# Patient Record
Sex: FEMALE | Race: WHITE
[De-identification: names, ages, dates, MRNs, and addresses within clinical notes are randomized per-mention and may not be internally consistent; named-entity substitution may affect disease eponyms.]

---

## 2021-04-15 ENCOUNTER — HOSPITAL ENCOUNTER (EMERGENCY)
Dept: HOSPITAL 11 - JP.ED | Age: 21
Discharge: HOME | End: 2021-04-15
Payer: COMMERCIAL

## 2021-04-15 DIAGNOSIS — O03.9: Primary | ICD-10-CM

## 2021-04-15 NOTE — EDM.PDOC
ED HPI GENERAL MEDICAL PROBLEM





- General


Chief Complaint: OB/GYN Problem


Stated Complaint: 6 WKS PREGNAT, CRAMPS AND LIGHT HEAVY BLEEDING


Time Seen by Provider: 04/15/21 20:25


Source of Information: Reports: Patient


History Limitations: Reports: No Limitations





- History of Present Illness


INITIAL COMMENTS - FREE TEXT/NARRATIVE: 





20-year-old female, never pregnant before did a pregnancy test 6 days ago which 

was positive.  2 days ago she started having some cramping and today started 

bleeding.  She is passing some clots.  She just wants to make sure that she is 

miscarrying and not something else is wrong.  No fever chills, no significant 

abdominal pain.


Onset: Gradual


Duration: Day(s): (Symptoms over the past 2 or 3 days)


Location: Reports: Other (Pelvic cramping and pain, vaginal bleeding)


Associated Symptoms: Reports: No Other Symptoms


  ** abd cramping


Pain Score (Numeric/FACES): 4





- Related Data


                                    Allergies











Allergy/AdvReac Type Severity Reaction Status Date / Time


 


No Known Allergies Allergy   Verified 04/15/21 20:14











Home Meds: 


                                    Home Meds





NK [No Known Home Meds]  04/15/21 [History]











Past Medical History


HEENT History: Reports: Impaired Vision, Other (See Below)


Other HEENT History: contacts


Psychiatric History: Reports: Anxiety, Depression, PTSD





- Infectious Disease History


Infectious Disease History: Reports: Chicken Pox





- Past Surgical History


HEENT Surgical History: Reports: Adenoidectomy, Tonsillectomy


Musculoskeletal Surgical History: Reports: Arthroscopic Procedure, Other (See 

Below)


Other Musculoskeletal Surgeries/Procedures:: left and right knee arthroscopic 

procedure





Social & Family History





- Tobacco Use


Tobacco Use Status *Q: Never Tobacco User





- Recreational Drug Use


Recreational Drug Use: No





ED ROS GENERAL





- Review of Systems


Review Of Systems: See Below


Constitutional: Denies: Fever, Chills


Respiratory: Reports: No Symptoms


Cardiovascular: Reports: No Symptoms


GI/Abdominal: Reports: No Symptoms


: Reports: Other (Some pelvic cramping, vaginal bleeding).  Denies: Dysuria, 

Frequency, Urgency


Skin: Reports: No Symptoms


Neurological: Reports: No Symptoms





ED EXAM PREGNANCY





- Physical Exam


Exam: See Below


Exam Limited By: No Limitations


General Appearance: Alert, No Apparent Distress


Respiratory/Chest: No Respiratory Distress


Neurological: Alert, Oriented


Psychiatric: Normal Affect, Normal Mood


Skin Exam: Warm, Dry





Course





- Vital Signs


Last Recorded V/S: 


                                Last Vital Signs











Temp  97.9 F   04/15/21 20:19


 


Pulse  95   04/15/21 20:19


 


Resp  17   04/15/21 20:19


 


BP  170/104 H  04/15/21 20:19


 


Pulse Ox  99   04/15/21 20:19














- Orders/Labs/Meds


Labs: 


                                Laboratory Tests











  04/15/21 04/15/21 Range/Units





  20:52 20:52 


 


WBC  9.1   (4.5-11.0)  K/uL


 


RBC  4.70   (3.30-5.50)  M/uL


 


Hgb  13.9   (12.0-15.0)  g/dL


 


Hct  42.0   (36.0-48.0)  %


 


MCV  89   (80-98)  fL


 


MCH  30   (27-31)  pg


 


MCHC  33   (32-36)  %


 


Plt Count  279   (150-400)  K/uL


 


HCG, Quant   8 H  (0-6)  mIU/mL














- Re-Assessments/Exams


Free Text/Narrative Re-Assessment/Exam: 





04/16/21 04:13


CBC and quantitative beta-hCG was drawn, quantitative was only 8.  Explained to 

the patient that this is not indicative of a healthy pregnancy.  She should 

expect symptoms of a period,  possibly somewhat heavier than usual and can re

turn if pain or bleeding is uncontrolled or she develops fever.





Departure





- Departure


Time of Disposition: 21:38


Disposition: Home, Self-Care 01


Clinical Impression: 


 Complete miscarriage








- Discharge Information


Instructions:  Miscarriage, Easy-to-Read


Referrals: 


PCP,None [Primary Care Provider] - 


Forms:  ED Department Discharge


Care Plan Goals: 


Activity as tolerated, ibuprofen or naproxen for cramping and pain.  Return 

anytime if you feel you are bleeding too heavy, your pain is uncontrolled or you

develop other concerns.





Sepsis Event Note (ED)





- Evaluation


Sepsis Screening Result: No Definite Risk





- Focused Exam


Vital Signs: 


                                   Vital Signs











  Temp Pulse Resp BP Pulse Ox


 


 04/15/21 20:19  97.9 F  95  17  170/104 H  99


 


 04/15/21 20:17  97.9 F  95  17  170/104 H  99